# Patient Record
Sex: FEMALE | Race: WHITE | Employment: FULL TIME | URBAN - METROPOLITAN AREA
[De-identification: names, ages, dates, MRNs, and addresses within clinical notes are randomized per-mention and may not be internally consistent; named-entity substitution may affect disease eponyms.]

---

## 2022-06-28 ENCOUNTER — OFFICE VISIT (OUTPATIENT)
Dept: ORTHOPEDIC SURGERY | Age: 62
End: 2022-06-28
Payer: COMMERCIAL

## 2022-06-28 VITALS — HEART RATE: 81 BPM | OXYGEN SATURATION: 100 % | WEIGHT: 162 LBS | TEMPERATURE: 96.1 F

## 2022-06-28 DIAGNOSIS — G89.29 CHRONIC BILATERAL LOW BACK PAIN WITH RIGHT-SIDED SCIATICA: Primary | ICD-10-CM

## 2022-06-28 DIAGNOSIS — M54.41 CHRONIC BILATERAL LOW BACK PAIN WITH RIGHT-SIDED SCIATICA: Primary | ICD-10-CM

## 2022-06-28 PROCEDURE — 99204 OFFICE O/P NEW MOD 45 MIN: CPT | Performed by: PHYSICAL MEDICINE & REHABILITATION

## 2022-06-28 PROCEDURE — 72100 X-RAY EXAM L-S SPINE 2/3 VWS: CPT | Performed by: PHYSICAL MEDICINE & REHABILITATION

## 2022-06-28 RX ORDER — MONTELUKAST SODIUM 10 MG/1
TABLET ORAL
COMMUNITY
Start: 2021-09-10

## 2022-06-28 RX ORDER — FLUTICASONE PROPIONATE 50 MCG
SPRAY, SUSPENSION (ML) NASAL
COMMUNITY
Start: 2022-06-27

## 2022-06-28 RX ORDER — GABAPENTIN 300 MG/1
300 CAPSULE ORAL
Qty: 30 CAPSULE | Refills: 0 | Status: SHIPPED | OUTPATIENT
Start: 2022-06-28 | End: 2022-07-28

## 2022-06-28 RX ORDER — DICLOFENAC SODIUM 75 MG/1
75 TABLET, DELAYED RELEASE ORAL
Qty: 28 TABLET | Refills: 0 | Status: SHIPPED | OUTPATIENT
Start: 2022-06-28 | End: 2022-07-12

## 2022-06-28 RX ORDER — LISINOPRIL AND HYDROCHLOROTHIAZIDE 20; 25 MG/1; MG/1
TABLET ORAL
COMMUNITY
Start: 2021-09-10

## 2022-06-28 RX ORDER — ALBUTEROL SULFATE 90 UG/1
2 AEROSOL, METERED RESPIRATORY (INHALATION)
COMMUNITY
Start: 2021-09-10

## 2022-06-28 RX ORDER — SIMVASTATIN 10 MG/1
TABLET, FILM COATED ORAL
COMMUNITY
Start: 2021-09-10

## 2022-06-28 RX ORDER — CALCIUM CARBONATE 200(500)MG
500 TABLET,CHEWABLE ORAL
COMMUNITY

## 2022-06-28 RX ORDER — ACETAMINOPHEN 500 MG
TABLET ORAL
COMMUNITY
End: 2022-09-07

## 2022-06-28 NOTE — PATIENT INSTRUCTIONS
Back Pain, Emergency or Urgent Symptoms: Care Instructions  Your Care Instructions     Many people have back pain at one time or another. In most cases, pain gets better with self-care that includes over-the-counter pain medicine, ice, heat, and exercises. Unless you have symptoms of a severe injury or heart attack, you may be able to give yourself a few days before you call a doctor. But some back problems are very serious. Do not ignore symptoms that need to be checked right away. Follow-up care is a key part of your treatment and safety. Be sure to make and go to all appointments, and call your doctor if you are having problems. It's also a good idea to know your test results and keep a list of the medicines you take. How can you care for yourself at home? · Sit or lie in positions that are most comfortable and that reduce your pain. Try one of these positions when you lie down:  ? Lie on your back with your knees bent and supported by large pillows. ? Lie on the floor with your legs on the seat of a sofa or chair. ? Lie on your side with your knees and hips bent and a pillow between your legs. ? Lie on your stomach if it does not make pain worse. · Do not sit up in bed, and avoid soft couches and twisted positions. Bed rest can help relieve pain at first, but it delays healing. Avoid bed rest after the first day. · Change positions every 30 minutes. If you must sit for long periods of time, take breaks from sitting. Get up and walk around, or lie flat. · Try using a heating pad on a low or medium setting, for 15 to 20 minutes every 2 or 3 hours. Try a warm shower in place of one session with the heating pad. You can also buy single-use heat wraps that last up to 8 hours. You can also try ice or cold packs on your back for 10 to 20 minutes at a time, several times a day. (Put a thin cloth between the ice pack and your skin.) This reduces pain and makes it easier to be active and exercise.   · Take pain medicines exactly as directed. ? If the doctor gave you a prescription medicine for pain, take it as prescribed. ? If you are not taking a prescription pain medicine, ask your doctor if you can take an over-the-counter medicine. When should you call for help? Call 911 anytime you think you may need emergency care. For example, call if:    · You are unable to move a leg at all.     · You have back pain with severe belly pain.     · You have symptoms of a heart attack. These may include:  ? Chest pain or pressure, or a strange feeling in the chest.  ? Sweating. ? Shortness of breath. ? Nausea or vomiting. ? Pain, pressure, or a strange feeling in the back, neck, jaw, or upper belly or in one or both shoulders or arms. ? Lightheadedness or sudden weakness. ? A fast or irregular heartbeat. After you call 911, the  may tell you to chew 1 adult-strength or 2 to 4 low-dose aspirin. Wait for an ambulance. Do not try to drive yourself. Call your doctor now or seek immediate medical care if:    · You have new or worse symptoms in your arms, legs, chest, belly, or buttocks. Symptoms may include:  ? Numbness or tingling. ? Weakness. ? Pain.     · You lose bladder or bowel control.     · You have back pain and:  ? You have injured your back while lifting or doing some other activity. Call if the pain is severe, has not gone away after 1 or 2 days, and you cannot do your normal daily activities. ? You have had a back injury before that needed treatment. ? Your pain has lasted longer than 4 weeks. ? You have had weight loss you cannot explain. ? You have a fever. ? You are age 48 or older. ? You have cancer now or have had it before. Watch closely for changes in your health, and be sure to contact your doctor if you are not getting better as expected. Where can you learn more?   Go to http://www.gray.com/  Enter J972 in the search box to learn more about \"Back Pain, Emergency or Urgent Symptoms: Care Instructions. \"  Current as of: July 1, 2021               Content Version: 13.2  © 2006-2022 [employer]. Care instructions adapted under license by [employer] (which disclaims liability or warranty for this information). If you have questions about a medical condition or this instruction, always ask your healthcare professional. [address] any warranty or liability for your use of this information. Back Care and Preventing Injuries: Care Instructions  Your Care Instructions     You can hurt your back doing many everyday activities: lifting a heavy box, bending down to garden, exercising at the gym, and even getting out of bed. But you can keep your back strong and healthy by doing some exercises. You also can follow a few tips for sitting, sleeping, and lifting to avoid hurting your back again. Talk to your doctor before you start an exercise program. Ask for help if you want to learn more about keeping your back healthy. Follow-up care is a key part of your treatment and safety. Be sure to make and go to all appointments, and call your doctor if you are having problems. It's also a good idea to know your test results and keep a list of the medicines you take. How can you care for yourself at home? · Stay at a healthy weight to avoid strain on your lower back. · Do not smoke. Smoking increases the risk of osteoporosis, which weakens the spine. If you need help quitting, talk to your doctor about stop-smoking programs and medicines. These can increase your chances of quitting for good. · Make sure you sleep in a position that maintains your back's normal curves and on a mattress that feels comfortable. Sleep on your side with a pillow between your knees, or sleep on your back with a pillow under your knees. These positions can reduce strain on your back. · When you get out of bed, lie on your side and bend both knees. Drop your feet over the edge of the bed as you push up with both arms. Scoot to the edge of the bed. Make sure your feet are in line with your rear end (buttocks), and then stand up. · If you must stand for a long time, put one foot on a stool, ledge, or box. Exercise to strengthen your back and other muscles  · Get at least 30 minutes of exercise on most days of the week. Walking is a good choice. You also may want to do other activities, such as running, swimming, cycling, or playing tennis or team sports. · Stretch your back muscles. Here are few exercises to try:  ? Lie on your back with your knees bent and your feet flat on the floor. Gently pull one bent knee to your chest. Put that foot back on the floor, and then pull the other knee to your chest. Hold for 15 to 30 seconds. Repeat 2 to 4 times. ? Do pelvic tilts. Lie on your back with your knees bent. Tighten your stomach muscles. Pull your belly button (navel) in and up toward your ribs. You should feel like your back is pressing to the floor and your hips and pelvis are slightly lifting off the floor. Hold for 6 seconds while breathing smoothly. · Keep your core muscles strong. The muscles of your back, belly (abdomen), and buttocks support your spine. ? Pull in your belly, and imagine pulling your navel toward your spine. Hold this for 6 seconds, then relax. Remember to keep breathing normally as you tense your muscles. ? Do curl-ups. Always do them with your knees bent. Keep your low back on the floor, and curl your shoulders toward your knees using a smooth, slow motion. Keep your arms folded across your chest. If this bothers your neck, try putting your hands behind your neck (not your head), with your elbows spread apart. ? Lie on your back with your knees bent and your feet flat on the floor. Tighten your belly muscles, and then push with your feet and raise your buttocks up a few inches.  Hold this position 6 seconds as you continue to breathe normally, then lower yourself slowly to the floor. Repeat 8 to 12 times. ? If you like group exercise, try Pilates or yoga. These classes have poses that strengthen the core muscles. Protect your back when you sit  · Place a small pillow, a rolled-up towel, or a lumbar roll in the curve of your back if you need extra support. · Sit in a chair that is low enough to let you place both feet flat on the floor with both knees nearly level with your hips. If your chair or desk is too high, use a foot rest to raise your knees. · When driving, keep your knees nearly level with your hips. Sit straight, and drive with both hands on the steering wheel. Your arms should be in a slightly bent position. · Try a kneeling chair, which helps tilt your hips forward. This takes pressure off your lower back. · Try sitting on an exercise ball. It can rock from side to side, which helps keep your back loose. Lift properly  · Squat down, bending at the hips and knees only. If you need to, put one knee to the floor and extend your other knee in front of you, bent at a right angle (half kneeling). · Press your chest straight forward. This helps keep your upper back straight while keeping a slight arch in your low back. · Hold the load as close to your body as possible, at the level of your navel. · Use your feet to change direction, taking small steps. · Lead with your hips as you change direction. Keep your shoulders in line with your hips as you move. Do not twist your body. · Set down your load carefully, squatting with your knees and hips only. When should you call for help? Watch closely for changes in your health, and be sure to contact your doctor if you have any problems. Where can you learn more? Go to http://www.gray.com/  Enter S810 in the search box to learn more about \"Back Care and Preventing Injuries: Care Instructions. \"  Current as of: July 1, 2021               Content Version: 13.2  © 0270-0080 [employer]. Care instructions adapted under license by [employer] (which disclaims liability or warranty for this information). If you have questions about a medical condition or this instruction, always ask your healthcare professional. [address] any warranty or liability for your use of this information. Back Stretches: Exercises  Introduction  Here are some examples of exercises for stretching your back. Start each exercise slowly. Ease off the exercise if you start to have pain. Your doctor or physical therapist will tell you when you can start these exercises and which ones will work best for you. How to do the exercises  Overhead stretch    1. Stand comfortably with your feet shoulder-width apart. 2. Looking straight ahead, raise both arms over your head and reach toward the ceiling. Do not allow your head to tilt back. 3. Hold for 15 to 30 seconds, then lower your arms to your sides. 4. Repeat 2 to 4 times. Side stretch    1. Stand comfortably with your feet shoulder-width apart. 2. Raise one arm over your head, and then lean to the other side. 3. Slide your hand down your leg as you let the weight of your arm gently stretch your side muscles. Hold for 15 to 30 seconds. 4. Repeat 2 to 4 times on each side. Press-up    1. Lie on your stomach, supporting your body with your forearms. 2. Press your elbows down into the floor to raise your upper back. As you do this, relax your stomach muscles and allow your back to arch without using your back muscles. As your press up, do not let your hips or pelvis come off the floor. 3. Hold for 15 to 30 seconds, then relax. 4. Repeat 2 to 4 times. Relax and rest    1. Lie on your back with a rolled towel under your neck and a pillow under your knees. Extend your arms comfortably to your sides. 2. Relax and breathe normally.   3. Remain in this position for about 10 minutes. 4. If you can, do this 2 or 3 times each day. Follow-up care is a key part of your treatment and safety. Be sure to make and go to all appointments, and call your doctor if you are having problems. It's also a good idea to know your test results and keep a list of the medicines you take. Where can you learn more? Go to http://www.ferrer.com/  Enter Y090 in the search box to learn more about \"Back Stretches: Exercises. \"  Current as of: July 1, 2021               Content Version: 13.2  © 2006-2022 [employer]. Care instructions adapted under license by [employer] (which disclaims liability or warranty for this information). If you have questions about a medical condition or this instruction, always ask your healthcare professional. [address] any warranty or liability for your use of this information. Acute Low Back Pain: Exercises  Introduction  Here are some examples of typical rehabilitation exercises for your condition. Start each exercise slowly. Ease off the exercise if you start to have pain. Your doctor or physical therapist will tell you when you can start these exercises and which ones will work best for you. When you are not being active, find a comfortable position for rest. Some people are comfortable on the floor or a medium-firm bed with a small pillow under their head and another under their knees. Some people prefer to lie on their side with a pillow between their knees. Don't stay in one position for too long. Take short walks (10 to 20 minutes) every 2 to 3 hours. Avoid slopes, hills, and stairs until you feel better. Walk only distances you can manage without pain, especially leg pain. How to do the exercises  Back stretches    1. Get down on your hands and knees on the floor. 2. Relax your head and allow it to droop.  Round your back up toward the ceiling until you feel a nice stretch in your upper, middle, and lower back. Hold this stretch for as long as it feels comfortable, or about 15 to 30 seconds. 3. Return to the starting position with a flat back while you are on your hands and knees. 4. Let your back sway by pressing your stomach toward the floor. Lift your buttocks toward the ceiling. 5. Hold this position for 15 to 30 seconds. 6. Repeat 2 to 4 times. Follow-up care is a key part of your treatment and safety. Be sure to make and go to all appointments, and call your doctor if you are having problems. It's also a good idea to know your test results and keep a list of the medicines you take. Where can you learn more? Go to http://www.gray.com/  Enter Z071 in the search box to learn more about \"Acute Low Back Pain: Exercises. \"  Current as of: September 8, 2021               Content Version: 13.2  © 2006-2022 [employer]. Care instructions adapted under license by [employer] (which disclaims liability or warranty for this information). If you have questions about a medical condition or this instruction, always ask your healthcare professional. [place] [address] any warranty or liability for your use of this information.

## 2022-06-28 NOTE — PROGRESS NOTES
[address]  [address], [address]  [place], [address] [place] [address] Street  Phone: [phone]  Fax: [phone]      Patient: Denisha George                                                                              MRN: [id number]        YOB: 1960          AGE: 62 y.o. PCP: Luiza Ceron MD  Date:  06/28/22    Reason for Consultation: New Patient      HPI:  Denisha George is a 62 y.o. female with relevant PMH of scoliosis, HTN, who presents with chronic low back pain with intermittent radiation down right and left legs which has been manageable in the past.  About 5 weeks ago her symptoms worsened after sitting on a hard seat for over 5 hrs. She developed pain radiating down the right leg which has not resolved. She has tried a prednisone pack (felt great the first day but pain returned)  and tylenol. Neurologic symptoms: No numbness, tingling, weakness, bowel or bladder changes. No recent falls      Location: The pain is located in the low back  Radiation: The pain does radiate right anterior lateral thigh, front of leg into the foot. Pain Score: Currently: 4/10    Quality: Pain is of a Achy, Burning, Cramping and Stabbing quality. Aggravating: Pain is exacerbated by sitting and driving, yard work, cleaning  Alleviating:  The pain is alleviated by walking, standing, lying down and exercise    Prior Treatments:  Physical therapy: NO  Injections:NO    Previous Medications:   Current Medications: prednisone-taper, tylenol  Previous work-up has included:     Past Medical History:   Past Medical History:   Diagnosis Date    High cholesterol     Hypertension     Prediabetes       Past Surgical History:   Past Surgical History:   Procedure Laterality Date    HC TOTAL HYSTERECTOMY        SocHx:   Social History     Tobacco Use    Smoking status: Never Smoker    Smokeless tobacco: Never Used   Substance Use Topics    Alcohol use: Yes     Comment: occasionally      FamHx:? Family History   Problem Relation Age of Onset    Stroke Mother     Diabetes Father     Hypertension Father     Heart Disease Father        Current Medications:    Current Outpatient Medications   Medication Sig Dispense Refill    albuterol (PROVENTIL HFA, VENTOLIN HFA, PROAIR HFA) 90 mcg/actuation inhaler Take 2 Puffs by inhalation.  calcium carbonate (TUMS) 200 mg calcium (500 mg) chew Take 500 mg by mouth.  fluticasone propionate (Flonase Allergy Relief) 50 mcg/actuation nasal spray       krill-om-3-dha-epa-phospho-ast [id number] mg cap Take  by mouth.  lisinopril-hydroCHLOROthiazide (PRINZIDE, ZESTORETIC) 20-25 mg per tablet       montelukast (SINGULAIR) 10 mg tablet       therapeutic multivitamin-minerals (THERAGRAN-M) tablet Take 1 Tablet by mouth daily.  simvastatin (ZOCOR) 10 mg tablet         Allergies: Allergies   Allergen Reactions    Erythromycin Hives    Sulfa (Sulfonamide Antibiotics) Hives        Review of Systems:   Gen:    Denied fevers, chills, malaise, fatigue, weight changes   Resp: Denied shortness of breath, cough, wheezing   CVS: Denied chest pain, palpitations   : Denied urinary urgency, frequency, incontinence   GI: Denied nausea, vomiting, constipation, diarrhea   Skin: Denied rashes, wounds   Psych: Denied anxiety, depression   Vasc: Denied claudication, ulcers   Hem: Denied easy bruising/bleeding   MSK: See HPI   Neuro: See HPI         Physical Exam     Vital Signs:   Visit Vitals  Pulse 81   Temp (!) 96.1 °F (35.6 °C) (Temporal)   Wt 162 lb (73.5 kg)   SpO2 100%      General: ??????? Well nourished and well developed female without any acute distress   Psychiatric: ?  Alert and oriented x 3 with normal mood    HEENT: ???????? Atraumatic   Respiratory:   Breathing non-labored and non dyspneic   CV: ???????????????? Peripheral pulses intact, no peripheral edema   Skin: ?????????????   No rashes Neurologic: ?? Sensation: normal and grossly intact thebilateral, lower extremity(s)  Strength: 5/5 in the bilateral, lower extremity(s)   Reflexes: reveals 2+ symmetric DTRs throughout   Gait: normal     Musculoskeletal: Lumbar Exam     Inspection:   Alignment: Abnormal scoliosis  Atrophy: None     Tenderness to Palpation:   Lumbar paraspinals Positive +b/l  Lumbar spinous processes Negative  SI Joint:  Positive ++  Gluteal:Positive++   Greater trochanter: Negative      ROM:   Lumbar ROM: Abnormal pain with extension  Lumbar facet loading: Negative  Hip ROM: No reproduction of pain with movement     Special Tests      Slump test: Positive right   SLR: Negative  SELENE: Negative  FADIR: Negative  Log Roll: Negative      Medical Decision Making:    Images: The imaging results as well as the actual images of the studies below were reviewed, visualized and interpreted by me. Labs: The results below were reviewed. Lumbar spine x-ray 2 views AP and Lat- thoracolumbar scoliosis, lower lumbar DDD     Assessment:   -scoliosis  -right lumbar radiculopathy     Plan:      -Physical therapy -  Referral to start PT   -Medications -    -will try diclofenac 75mg bid x 10 days. Not to take with other NSAIDS-- Counseled regarding side effects and appropriate administration of medications.     -Start gabapentin 300mg qhs   Reviewed potential side effects including but not limited to: drowsiness, dizziness, feeling in a fog, mood changes, fluid retention.   -Diagnostics/Imaging - x-ray lumbar spine POC  -Injections - Consider DA   -Lifestyle -Discussed activities to avoid and those to continue   -Education - The patient's diagnosis, prognosis and treatment options were discussed today. All questions were answered.     F/U - 4 weeks if no improvement will get MRI lumbar spine         [facility]

## 2022-07-13 ENCOUNTER — TELEPHONE (OUTPATIENT)
Dept: ORTHOPEDIC SURGERY | Age: 62
End: 2022-07-13

## 2022-07-13 NOTE — TELEPHONE ENCOUNTER
Patient called stating her Voltaren did not help with her pain, and she is having side affects from her Gabapentin. She stated she has to force herself to wake up in the morning because the Gabapentin makes her drowsy. She stated she is going to stop taking the Gabapentin. Patient stated an MRI order was discussed at her appointment, and she feels she is needing one. Patient is requesting a call back and can be reached at [phone].

## 2022-07-14 ENCOUNTER — TELEPHONE (OUTPATIENT)
Dept: ORTHOPEDIC SURGERY | Age: 62
End: 2022-07-14

## 2022-07-14 NOTE — TELEPHONE ENCOUNTER
Returned call and left VM.  I can place and order for MRI but I am worried it may be denied if she has not had any physical therapy yet

## 2022-07-14 NOTE — LETTER
7/27/2022 4:55 PM    Ms. Gerri Tovar  [address] [place] [id number]    To whom it may concern,    Gerri Tovar is currently under my care for low back pain. Due to weakness she has in her foot I recommend she wear a comfortable, supportive, shoe like a sneaker for stability.             Sincerely,      Trevon Ruiz MD

## 2022-07-14 NOTE — TELEPHONE ENCOUNTER
Patient called requesting doctor to call back, patient stated she missed phone call she stated she was in physical therapy.  Patient contact [phone]

## 2022-07-15 NOTE — TELEPHONE ENCOUNTER
Patient stated she called Dr Bala Landry yesterday and has been awaiting a returned call all day in regards to previous messages. Patient also states that she has been taking physical therapy.       Patient advise callback   [facility]#[phone]

## 2022-07-26 ENCOUNTER — OFFICE VISIT (OUTPATIENT)
Dept: ORTHOPEDIC SURGERY | Age: 62
End: 2022-07-26
Payer: COMMERCIAL

## 2022-07-26 VITALS
WEIGHT: 162 LBS | SYSTOLIC BLOOD PRESSURE: 135 MMHG | HEART RATE: 68 BPM | RESPIRATION RATE: 18 BRPM | OXYGEN SATURATION: 98 % | HEIGHT: 63 IN | DIASTOLIC BLOOD PRESSURE: 69 MMHG | TEMPERATURE: 97.4 F | BODY MASS INDEX: 28.7 KG/M2

## 2022-07-26 DIAGNOSIS — M70.61 GREATER TROCHANTERIC BURSITIS OF BOTH HIPS: ICD-10-CM

## 2022-07-26 DIAGNOSIS — M54.16 RIGHT LUMBAR RADICULOPATHY: Primary | ICD-10-CM

## 2022-07-26 DIAGNOSIS — M70.62 GREATER TROCHANTERIC BURSITIS OF BOTH HIPS: ICD-10-CM

## 2022-07-26 DIAGNOSIS — M54.16 RIGHT LUMBAR RADICULOPATHY: ICD-10-CM

## 2022-07-26 PROCEDURE — 99213 OFFICE O/P EST LOW 20 MIN: CPT | Performed by: PHYSICAL MEDICINE & REHABILITATION

## 2022-07-26 NOTE — PATIENT INSTRUCTIONS
[facility]    Please expect an automated call within 24-48 business hours to schedule your outpatient study with [employer]    If you have not received an automated call, please call [phone] to speak directly with a     [address] at [address]

## 2022-07-26 NOTE — PROGRESS NOTES
Fran Grade presents today for   Chief Complaint   Patient presents with    Back Pain    Hip Pain       Is someone accompanying this pt? no    Is the patient using any DME equipment during OV? no    Depression Screening:  No flowsheet data found. Learning Assessment:  No flowsheet data found. Abuse Screening:  No flowsheet data found. Fall Risk  No flowsheet data found. OPIOID RISK TOOL  No flowsheet data found. Coordination of Care:  1. Have you been to the ER, urgent care clinic since your last visit? no  Hospitalized since your last visit? no    2. Have you seen or consulted any other health care providers outside of the [address] [place] [address] since your last visit? no Include any pap smears or colon screening.  Yes in june

## 2022-07-27 NOTE — TELEPHONE ENCOUNTER
Patient called for Ana Zavala. Patient said that she is having her Mri of the Lumbar Spine done on 8/12/22 at [facility]. Patient said that she needs a Letter from Ana Zavala for work. That the note should state that she is allowed to wear Tennis Shoes at work. Instead of High Heels. Patient tel. [phone].

## 2022-07-28 NOTE — TELEPHONE ENCOUNTER
I tried to contact the pt. She was not able to be reached. A message was left for the pt letting her know that the letter that was requested has been completed. I let the pt know that she can access this through her My Chart account or she has the option to come by the office and they will print her a copy. The number to the office was provided in case the pt has any questions or concerns.

## 2022-08-15 ENCOUNTER — HOSPITAL ENCOUNTER (OUTPATIENT)
Age: 62
Discharge: HOME | End: 2022-08-15
Attending: PHYSICAL MEDICINE & REHABILITATION
Payer: COMMERCIAL

## 2022-08-15 PROCEDURE — 72148 MRI LUMBAR SPINE W/O DYE: CPT

## 2022-09-07 ENCOUNTER — PATIENT MESSAGE (OUTPATIENT)
Dept: ORTHOPEDIC SURGERY | Age: 62
End: 2022-09-07

## 2022-09-07 ENCOUNTER — OFFICE VISIT (OUTPATIENT)
Dept: ORTHOPEDIC SURGERY | Age: 62
End: 2022-09-07
Payer: COMMERCIAL

## 2022-09-07 VITALS
BODY MASS INDEX: 27.83 KG/M2 | WEIGHT: 163 LBS | RESPIRATION RATE: 18 BRPM | TEMPERATURE: 97.5 F | SYSTOLIC BLOOD PRESSURE: 132 MMHG | DIASTOLIC BLOOD PRESSURE: 81 MMHG | OXYGEN SATURATION: 98 % | HEART RATE: 91 BPM | HEIGHT: 64 IN

## 2022-09-07 DIAGNOSIS — M54.41 CHRONIC BILATERAL LOW BACK PAIN WITH RIGHT-SIDED SCIATICA: ICD-10-CM

## 2022-09-07 DIAGNOSIS — M54.16 RIGHT LUMBAR RADICULOPATHY: Primary | ICD-10-CM

## 2022-09-07 DIAGNOSIS — G89.29 CHRONIC BILATERAL LOW BACK PAIN WITH RIGHT-SIDED SCIATICA: ICD-10-CM

## 2022-09-07 PROCEDURE — 99214 OFFICE O/P EST MOD 30 MIN: CPT | Performed by: PHYSICAL MEDICINE & REHABILITATION

## 2022-09-07 NOTE — PROGRESS NOTES
Hu Mujica presents today for   Chief Complaint   Patient presents with    Back Pain       Is someone accompanying this pt? no    Is the patient using any DME equipment during OV? no    Depression Screening:  No flowsheet data found. Learning Assessment:  No flowsheet data found. Abuse Screening:  No flowsheet data found. Fall Risk  No flowsheet data found. OPIOID RISK TOOL  No flowsheet data found. Coordination of Care:  1. Have you been to the ER, urgent care clinic since your last visit? no  Hospitalized since your last visit? no    2. Have you seen or consulted any other health care providers outside of the [address] [place] [address] since your last visit? no Include any pap smears or colon screening.  no

## 2022-09-07 NOTE — PROGRESS NOTES
[address]  [address], [address]  [facility], [address]  Phone: [phone]  Fax: [phone]      Patient: Georgiana Goldman                                                                              MRN: [id number]        YOB: 1960          AGE: 65 y.o. PCP: Brady Leigh MD  Date:  09/07/22    Reason for Consultation: Back Pain      HPI:  Georgiana Goldman is a 65 y.o. female with relevant PMH of scoliosis, HTN, who presented with chronic low back pain with intermittent radiation down right and left legs which has been manageable in the past.  Around memorial day 2022 ago her symptoms worsened after sitting on a hard seat for over 5 hrs. She developed pain radiating down the right leg which has not resolved. She has tried a prednisone pack (felt great the first day but pain returned)  and tylenol. Since her last visit she has completed PT. Pain has improved but she continues to have weakness in her right foot. We got an MRI which demonstrates degenerative changes severe disc space narrowing L5/S1, mild foraminal narrowing and on right side mild right L4 nerve abutment       Neurologic symptoms: No numbness, tingling, weakness, bowel or bladder changes. No recent falls      Location: The pain is located in the low back and b/l hips   Radiation: The pain does radiate right anterior lateral thigh, front of leg into the foot. Pain Score: Currently: 1/10  , at worst 3/10   Quality: Pain is of a Achy, Burning, Cramping and Stabbing quality. Aggravating: Pain is exacerbated by sitting and driving, yard work, cleaning  Alleviating:  The pain is alleviated by walking, standing, lying down and exercise    Prior Treatments:  Physical therapy: Currently in PT x 3 weeks  Injections:NO    Previous Medications: prednisone-taper, tylenol, diclofenac, gabapentin 300mg   Current Medications:   Previous work-up has included: 6/28/2022- Lumbar spine x-ray 2 views AP and Lat- thoracolumbar scoliosis, lower lumbar DDD   MRI Results (most recent):  Results from Orders Only encounter on 07/26/22    MRI LUMB SPINE WO CONT    Narrative  EXAMINATION: MRI lumbar spine without contrast    INDICATION: Lumbar pain, right lower extremity pain    COMPARISON: None    TECHNIQUE: Sagittal and axial multiecho MRI sequences of the lumbar spine  without contrast.    FINDINGS:    General: Vertebral body heights preserved. L5-S1 disc space loss with evidence  of at least partial solid interbody fusion. Mild L4-L5 disc space loss. No  significant listhesis. Lumbar lordosis maintained. Conus and the level L1. No  abnormal signal in the distal cord. No abnormal epidural collection identified. No suspicious marrow signal.    Miscellaneous: No incidental significant findings outside of the lumbar spine  region. Levels:    T11-T12: Sagittal plane only. No significant degenerative change or stenosis. T12-L1: Tiny posterior central disc protrusion. Mild facet evident. Spinal canal  and foramina largely patent. L1-L2: No significant disc disease or stenosis. Mild facet arthropathy. L2-L3: Very minimal disc bulge. Mild facet arthropathy. Spinal canal artery  patent with minimal abutment of the crossing L3 nerves. Foramina patent. L3-L4: Mild disc bulge. Mild facet arthropathy. Spinal canal are patent with  abutment of the crossing right L4 nerve. Mild left foraminal stenosis. L4-L5: Left lateral recess tiny disc protrusion with background minimal disc  bulge. Moderate facet arthropathy. Spinal canal largely patent with minimal  abutment of the crossing L5 nerves. Minimal foraminal narrowing. L5-S1: Disc space loss with mild disc/osteophyte bulge. Mild facet arthropathy. Spinal canal patent. Mild left foraminal stenosis and mild abutment of the  exiting L5 roots bilaterally. Imaged sacrum: Unremarkable.     Impression  Multilevel mild disc herniations and notable L5-S1 disc space loss. Multilevel  mild to moderate facet arthropathy.  -Spinal canal largely patent. -Multifocal mild foraminal stenoses. Sites of nerve root abutment described  above. See additional details above. Past Medical History:   Past Medical History:   Diagnosis Date    High cholesterol     Hypertension     Prediabetes       Past Surgical History:   Past Surgical History:   Procedure Laterality Date    HC TOTAL HYSTERECTOMY        SocHx:   Social History     Tobacco Use    Smoking status: Never    Smokeless tobacco: Never   Substance Use Topics    Alcohol use: Yes     Comment: occasionally      FamHx:? Family History   Problem Relation Age of Onset    Stroke Mother     Diabetes Father     Hypertension Father     Heart Disease Father        Current Medications:    Current Outpatient Medications   Medication Sig Dispense Refill    beclomethasone dipropionate (QVAR REDIHALER HFA) 40 mcg/actuation HFAb inhaler Take 2 Puffs by inhalation two (2) times a day. albuterol (PROVENTIL HFA, VENTOLIN HFA, PROAIR HFA) 90 mcg/actuation inhaler Take 2 Puffs by inhalation. calcium carbonate (TUMS) 200 mg calcium (500 mg) chew Take 500 mg by mouth. fluticasone propionate (FLONASE) 50 mcg/actuation nasal spray       lisinopril-hydroCHLOROthiazide (PRINZIDE, ZESTORETIC) 20-25 mg per tablet       montelukast (SINGULAIR) 10 mg tablet       therapeutic multivitamin-minerals (THERAGRAN-M) tablet Take 1 Tablet by mouth daily. simvastatin (ZOCOR) 10 mg tablet       [id number]-epa-phospho-ast [id number] mg cap Take  by mouth. Allergies:     Allergies   Allergen Reactions    Latex, Natural Rubber Hives, Itching, Rash and Swelling     gloves      Erythromycin Hives    Sulfa (Sulfonamide Antibiotics) Hives        Review of Systems:   Gen:    Denied fevers, chills, malaise, fatigue, weight changes   Resp: Denied shortness of breath, cough, wheezing   CVS: Denied chest pain, palpitations   : Denied urinary urgency, frequency, incontinence   GI: Denied nausea, vomiting, constipation, diarrhea   Skin: Denied rashes, wounds   Psych: Denied anxiety, depression   Vasc: Denied claudication, ulcers   Hem: Denied easy bruising/bleeding   MSK: See HPI   Neuro: See HPI         Physical Exam     Vital Signs:   Visit Vitals  /81 (BP 1 Location: Right arm, BP Patient Position: Sitting, BP Cuff Size: Adult)   Pulse 91   Temp 97.5 °F (36.4 °C) (Temporal)   Resp 18   Ht 5' 3.5\" (1.613 m)   Wt 163 lb (73.9 kg)   SpO2 98% Comment: RA   BMI 28.42 kg/m²      General: ??????? Well nourished and well developed female without any acute distress   Psychiatric: ?  Alert and oriented x 3 with normal mood    HEENT: ???????? Atraumatic   Respiratory:   Breathing non-labored and non dyspneic   CV: ???????????????? Peripheral pulses intact, no peripheral edema   Skin: ????????????? No rashes       Neurologic: ?? Sensation: normal and grossly intact thebilateral, lower extremity(s)  Strength: 5/5 in the bilateral, lower extremity(s) except weakness right ankle DF 4/5   Reflexes: reveals 2+ symmetric DTRs throughout   Gait: normal     Musculoskeletal: Lumbar Exam     Inspection:   Alignment: Abnormal scoliosis  Atrophy: None     Tenderness to Palpation:   Lumbar paraspinals Positive +b/l  Lumbar spinous processes Negative  SI Joint:  Positive ++  Gluteal:Positive++   Greater trochanter: Positive b/l      ROM:   Lumbar ROM: Abnormal pain with extension  Lumbar facet loading: Negative  Hip ROM: No reproduction of pain with movement     Special Tests      Slump test: Positive right   SLR: Negative  SELENE: Negative  FADIR: Negative  Log Roll: Negative      Medical Decision Making:    Images: The imaging results as well as the actual images of the studies below were reviewed, visualized and interpreted by me. Labs: The results below were reviewed.    Lumbar spine x-ray 2 views AP and Lat- thoracolumbar scoliosis, lower lumbar DDD   MRI lumbar spine 9/7/2022- DDD l5/S1 ---DDD, L3/4 right l4 nerve abutment    Assessment:   -scoliosis  -right lumbar radiculopathy -l4  -b/l GT bursitis     Plan:      -Physical therapy -  Continue HEP. Exercises provided to help strengthening b/l hips gluteus medius   -Medications - NA  -Diagnostics/Imaging - MRI lumbar spine reviewed  -Injections - scheduled right L4 TF DA   -Lifestyle -Discussed activities to avoid and those to continue   -Education - The patient's diagnosis, prognosis and treatment options were discussed today. All questions were answered.     F/U - after DA recheck strength        [facility]

## 2022-09-15 ENCOUNTER — HOSPITAL ENCOUNTER (OUTPATIENT)
Age: 62
Setting detail: OUTPATIENT SURGERY
Discharge: HOME | End: 2022-09-15
Attending: PHYSICAL MEDICINE & REHABILITATION | Admitting: PHYSICAL MEDICINE & REHABILITATION
Payer: COMMERCIAL

## 2022-09-15 ENCOUNTER — APPOINTMENT (OUTPATIENT)
Dept: GENERAL RADIOLOGY | Age: 62
End: 2022-09-15
Attending: PHYSICAL MEDICINE & REHABILITATION
Payer: COMMERCIAL

## 2022-09-15 VITALS
TEMPERATURE: 98.5 F | HEART RATE: 83 BPM | RESPIRATION RATE: 16 BRPM | SYSTOLIC BLOOD PRESSURE: 143 MMHG | OXYGEN SATURATION: 95 % | DIASTOLIC BLOOD PRESSURE: 79 MMHG

## 2022-09-15 LAB — GLUCOSE BLD STRIP.AUTO-MCNC: 126 MG/DL (ref 70–110)

## 2022-09-15 PROCEDURE — 77030003669 HC NDL SPN COOK -B: Performed by: PHYSICAL MEDICINE & REHABILITATION

## 2022-09-15 PROCEDURE — 74011000636 HC RX REV CODE- 636: Performed by: PHYSICAL MEDICINE & REHABILITATION

## 2022-09-15 PROCEDURE — 74011250636 HC RX REV CODE- 250/636: Performed by: PHYSICAL MEDICINE & REHABILITATION

## 2022-09-15 PROCEDURE — 82962 GLUCOSE BLOOD TEST: CPT

## 2022-09-15 PROCEDURE — 74011250637 HC RX REV CODE- 250/637: Performed by: PHYSICAL MEDICINE & REHABILITATION

## 2022-09-15 PROCEDURE — 77030039433 HC TY MYLEOGRAM BD -B: Performed by: PHYSICAL MEDICINE & REHABILITATION

## 2022-09-15 PROCEDURE — 74011000250 HC RX REV CODE- 250: Performed by: PHYSICAL MEDICINE & REHABILITATION

## 2022-09-15 PROCEDURE — 77030003676 HC NDL SPN MPRI -A: Performed by: PHYSICAL MEDICINE & REHABILITATION

## 2022-09-15 PROCEDURE — 64483 NJX AA&/STRD TFRM EPI L/S 1: CPT | Performed by: PHYSICAL MEDICINE & REHABILITATION

## 2022-09-15 PROCEDURE — 76010000009 HC PAIN MGT 0 TO 30 MIN PROC: Performed by: PHYSICAL MEDICINE & REHABILITATION

## 2022-09-15 PROCEDURE — 2709999900 HC NON-CHARGEABLE SUPPLY: Performed by: PHYSICAL MEDICINE & REHABILITATION

## 2022-09-15 RX ORDER — DEXAMETHASONE SODIUM PHOSPHATE 100 MG/10ML
INJECTION INTRAMUSCULAR; INTRAVENOUS AS NEEDED
Status: DISCONTINUED | OUTPATIENT
Start: 2022-09-15 | End: 2022-09-15 | Stop reason: HOSPADM

## 2022-09-15 RX ORDER — DIAZEPAM 5 MG/1
5-20 TABLET ORAL ONCE
Status: COMPLETED | OUTPATIENT
Start: 2022-09-15 | End: 2022-09-15

## 2022-09-15 RX ORDER — LIDOCAINE HYDROCHLORIDE 10 MG/ML
INJECTION, SOLUTION EPIDURAL; INFILTRATION; INTRACAUDAL; PERINEURAL AS NEEDED
Status: DISCONTINUED | OUTPATIENT
Start: 2022-09-15 | End: 2022-09-15 | Stop reason: HOSPADM

## 2022-09-15 RX ADMIN — DIAZEPAM 5 MG: 5 TABLET ORAL at 13:17

## 2022-09-15 NOTE — PROCEDURES
PROCEDURE NOTE      Patient Name: Georgiana Goldman    Date of Procedure: September 15, 2022    Preoperative Diagnosis:  Lumbar radiculopathy [M54.16]    Post Operative Diagnosis:  Lumbar radiculopathy [M54.16]    Procedure :    right L4 Selective Nerve Root Block      Consent:  Informed consent was obtained prior to the procedure. The patient was given the opportunity to ask questions regarding the procedure and its associated risks. In addition to the potential risks associated with the procedure itself, the patient was informed both verbally and in writing of the potential side effects of the use of glucocorticoid. The patient appeared to comprehend the informed consent and desired to have the procedure performed. Procedure: The patient was placed in the prone position on the fluoroscopy table and the back was prepped and draped in the usual sterile manner. The exact spinal level was  identified using fluoroscopy, and Lidocaine 1 % was injected locally, a # 22 gauge spinal needle was passed to the transverse process. The depth was noted and the needle redirected to pass inferior and approximately one cm anterior to the transverse process. YES  1 cc of Isovue M-200 was used to verify positioning in the epidural and paravertebral space and outlined the course of the spinal nerve into the epidural space. The same procedure was repeated at each spinal level indicated above. A total of 10 mg of preservative free Dexamethasone and 1 cc of Lidocaine was slowly injected. The patient tolerated the procedure well. The injection area was cleaned and bandaids applied. Not excessive bleeding was noted. Patient dressed and discharged to home with instructions. Discussion: The patient tolerated the procedure well.                                               Akbar Herrmann MD  September 15, 2022

## 2022-09-15 NOTE — PERIOP NOTES
Patient verbalized understanding of discharge instructions and verbally consented to [facility] Patient Consent. Signature pad not working.

## 2022-10-25 ENCOUNTER — OFFICE VISIT (OUTPATIENT)
Dept: ORTHOPEDIC SURGERY | Age: 62
End: 2022-10-25
Payer: COMMERCIAL

## 2022-10-25 VITALS
OXYGEN SATURATION: 99 % | WEIGHT: 165.4 LBS | SYSTOLIC BLOOD PRESSURE: 118 MMHG | TEMPERATURE: 97.9 F | HEART RATE: 76 BPM | HEIGHT: 63 IN | RESPIRATION RATE: 18 BRPM | DIASTOLIC BLOOD PRESSURE: 81 MMHG | BODY MASS INDEX: 29.3 KG/M2

## 2022-10-25 DIAGNOSIS — M54.41 CHRONIC BILATERAL LOW BACK PAIN WITH RIGHT-SIDED SCIATICA: ICD-10-CM

## 2022-10-25 DIAGNOSIS — M54.16 RIGHT LUMBAR RADICULOPATHY: Primary | ICD-10-CM

## 2022-10-25 DIAGNOSIS — G89.29 CHRONIC BILATERAL LOW BACK PAIN WITH RIGHT-SIDED SCIATICA: ICD-10-CM

## 2022-10-25 PROCEDURE — 99212 OFFICE O/P EST SF 10 MIN: CPT | Performed by: PHYSICAL MEDICINE & REHABILITATION

## 2022-10-25 PROCEDURE — 3078F DIAST BP <80 MM HG: CPT | Performed by: PHYSICAL MEDICINE & REHABILITATION

## 2022-10-25 PROCEDURE — 3074F SYST BP LT 130 MM HG: CPT | Performed by: PHYSICAL MEDICINE & REHABILITATION

## 2022-10-25 NOTE — PROGRESS NOTES
Don Erp presents today for   Chief Complaint   Patient presents with    Back Pain       Is someone accompanying this pt? no    Is the patient using any DME equipment during OV? no    Depression Screening:  No flowsheet data found. Learning Assessment:  No flowsheet data found. Abuse Screening:  No flowsheet data found. Fall Risk  No flowsheet data found. OPIOID RISK TOOL  No flowsheet data found. Coordination of Care:  1. Have you been to the ER, urgent care clinic since your last visit? no  Hospitalized since your last visit? no    2. Have you seen or consulted any other health care providers outside of the [address] [place] [address] since your last visit? no Include any pap smears or colon screening.  no

## 2022-10-25 NOTE — PROGRESS NOTES
[address]  [address], [address]  [place], [address] [place] [address] Street  Phone: [phone]  Fax: [phone]      Patient: Betzaida Butler                                                                              MRN: [id number]        YOB: 1960          AGE: 65 y.o. PCP: José Antonio Kee MD  Date:  10/25/22    Reason for Consultation: Back Pain      HPI:  Betzaida Butler is a 65 y.o. female with relevant PMH of scoliosis, HTN, who presented with chronic low back pain with intermittent radiation down right and left legs which has been manageable in the past.  Around memorial day 2022 ago her symptoms worsened after sitting on a hard seat for over 5 hrs. She developed pain radiating down the right leg which has not resolved. She has tried a prednisone pack (felt great the first day but pain returned)  and tylenol. Since her last visit she has completed PT. We got an MRI which demonstrates degenerative changes severe disc space narrowing L5/S1, mild foraminal narrowing and on right side mild right L4 nerve abutment. She had a right L4 TF DA 9/14/22 which helped tremendously and today she does not have pain. Neurologic symptoms: No numbness, tingling, weakness, bowel or bladder changes. No recent falls      Location: The pain is located in the low back and b/l hips   Radiation: The pain does radiate right anterior lateral thigh, front of leg into the foot. Pain Score: Currently: 0/10   Quality: Pain is of a Achy, Burning, Cramping and Stabbing quality. Aggravating: Pain is exacerbated by sitting and driving, yard work, cleaning  Alleviating:  The pain is alleviated by walking, standing, lying down and exercise    Prior Treatments:  Physical therapy: Completed PT  Injections:  9/15/22- right L4 SNRB Dr. Olivia Centeno 100% relief     Previous Medications: prednisone-taper, tylenol, diclofenac, gabapentin 300mg   Current Medications: Previous work-up has included:   6/28/2022- Lumbar spine x-ray 2 views AP and Lat- thoracolumbar scoliosis, lower lumbar DDD   MRI Results (most recent):  Results from Orders Only encounter on 07/26/22    MRI LUMB SPINE WO CONT    Narrative  EXAMINATION: MRI lumbar spine without contrast    INDICATION: Lumbar pain, right lower extremity pain    COMPARISON: None    TECHNIQUE: Sagittal and axial multiecho MRI sequences of the lumbar spine  without contrast.    FINDINGS:    General: Vertebral body heights preserved. L5-S1 disc space loss with evidence  of at least partial solid interbody fusion. Mild L4-L5 disc space loss. No  significant listhesis. Lumbar lordosis maintained. Conus and the level L1. No  abnormal signal in the distal cord. No abnormal epidural collection identified. No suspicious marrow signal.    Miscellaneous: No incidental significant findings outside of the lumbar spine  region. Levels:    T11-T12: Sagittal plane only. No significant degenerative change or stenosis. T12-L1: Tiny posterior central disc protrusion. Mild facet evident. Spinal canal  and foramina largely patent. L1-L2: No significant disc disease or stenosis. Mild facet arthropathy. L2-L3: Very minimal disc bulge. Mild facet arthropathy. Spinal canal artery  patent with minimal abutment of the crossing L3 nerves. Foramina patent. L3-L4: Mild disc bulge. Mild facet arthropathy. Spinal canal are patent with  abutment of the crossing right L4 nerve. Mild left foraminal stenosis. L4-L5: Left lateral recess tiny disc protrusion with background minimal disc  bulge. Moderate facet arthropathy. Spinal canal largely patent with minimal  abutment of the crossing L5 nerves. Minimal foraminal narrowing. L5-S1: Disc space loss with mild disc/osteophyte bulge. Mild facet arthropathy. Spinal canal patent. Mild left foraminal stenosis and mild abutment of the  exiting L5 roots bilaterally.     Imaged sacrum: Unremarkable. Impression  Multilevel mild disc herniations and notable L5-S1 disc space loss. Multilevel  mild to moderate facet arthropathy.  -Spinal canal largely patent. -Multifocal mild foraminal stenoses. Sites of nerve root abutment described  above. See additional details above. Past Medical History:   Past Medical History:   Diagnosis Date    High cholesterol     Hypertension     Prediabetes       Past Surgical History:   Past Surgical History:   Procedure Laterality Date    HC TOTAL HYSTERECTOMY        SocHx:   Social History     Tobacco Use    Smoking status: Never    Smokeless tobacco: Never   Substance Use Topics    Alcohol use: Yes     Comment: occasionally      FamHx:? Family History   Problem Relation Age of Onset    Stroke Mother     Diabetes Father     Hypertension Father     Heart Disease Father        Current Medications:    Current Outpatient Medications   Medication Sig Dispense Refill    beclomethasone dipropionate (QVAR REDIHALER HFA) 40 mcg/actuation HFAb inhaler Take 2 Puffs by inhalation two (2) times a day. albuterol (PROVENTIL HFA, VENTOLIN HFA, PROAIR HFA) 90 mcg/actuation inhaler Take 2 Puffs by inhalation. calcium carbonate (TUMS) 200 mg calcium (500 mg) chew Take 500 mg by mouth. fluticasone propionate (FLONASE) 50 mcg/actuation nasal spray       lisinopril-hydroCHLOROthiazide (PRINZIDE, ZESTORETIC) 20-25 mg per tablet       montelukast (SINGULAIR) 10 mg tablet       therapeutic multivitamin-minerals (THERAGRAN-M) tablet Take 1 Tablet by mouth daily. simvastatin (ZOCOR) 10 mg tablet         Allergies:     Allergies   Allergen Reactions    Latex, Natural Rubber Hives, Itching, Rash and Swelling     gloves      Erythromycin Hives    Sulfa (Sulfonamide Antibiotics) Hives        Review of Systems:   Gen:    Denied fevers, chills, malaise, fatigue, weight changes   Resp: Denied shortness of breath, cough, wheezing   CVS: Denied chest pain, palpitations : Denied urinary urgency, frequency, incontinence   GI: Denied nausea, vomiting, constipation, diarrhea   Skin: Denied rashes, wounds   Psych: Denied anxiety, depression   Vasc: Denied claudication, ulcers   Hem: Denied easy bruising/bleeding   MSK: See HPI   Neuro: See HPI         Physical Exam     Vital Signs:   Visit Vitals  /81 (BP 1 Location: Right arm, BP Patient Position: Sitting, BP Cuff Size: Adult)   Pulse 76   Temp 97.9 °F (36.6 °C) (Temporal)   Resp 18   Ht 5' 3\" (1.6 m)   Wt 165 lb 6.4 oz (75 kg)   SpO2 99% Comment: RA   BMI 29.30 kg/m²      General: ??????? Well nourished and well developed female without any acute distress   Psychiatric: ?  Alert and oriented x 3 with normal mood    HEENT: ???????? Atraumatic   Respiratory:   Breathing non-labored and non dyspneic   CV: ???????????????? Peripheral pulses intact, no peripheral edema   Skin: ????????????? No rashes       Neurologic: ?? Sensation: normal and grossly intact thebilateral, lower extremity(s)  Strength: 5/5 in the bilateral, lower extremity(s) Reflexes: reveals 2+ symmetric DTRs throughout   Gait: normal     Musculoskeletal: Lumbar Exam     Inspection:   Alignment: Abnormal scoliosis  Atrophy: None     Tenderness to Palpation:   Lumbar paraspinals Positive +b/l  Lumbar spinous processes Negative  SI Joint:  Positive ++  Gluteal:Positive++   Greater trochanter: Positive b/l      ROM:   Lumbar ROM: Abnormal pain with extension  Lumbar facet loading: Negative  Hip ROM: No reproduction of pain with movement     Special Tests      Slump test: Positive right   SLR: Negative  SELENE: Negative  FADIR: Negative  Log Roll: Negative      Medical Decision Making:    Images: The imaging results as well as the actual images of the studies below were reviewed, visualized and interpreted by me. Labs: The results below were reviewed.      Assessment:   -scoliosis  -right lumbar radiculopathy -l4  -b/l GT bursitis     Plan:      -Physical therapy -  Continue HEP. Exercises provided to help strengthening b/l hips gluteus medius   -Medications - NA  -Diagnostics/Imaging - MRI lumbar spine reviewed  -Injections - NA  -Lifestyle -Discussed activities to avoid and those to continue   -Education - The patient's diagnosis, prognosis and treatment options were discussed today. All questions were answered.     F/U - Follow up if pain returns         [address] [place] [address] and [facility]

## (undated) DEVICE — NDL SPNE MANAN 22GX6IN --

## (undated) DEVICE — BANDAGE ADH W0.75XL3IN UNIV WVN FAB NAT GEN USE STRP N ADH

## (undated) DEVICE — SYR 10ML LUER LOK 1/5ML GRAD --

## (undated) DEVICE — (D)NDL SPNE 22GX15CM -- DISC BY MFR W/NO SUB

## (undated) DEVICE — MEDIA CONTRAST 10ML 200MG/ML 41%

## (undated) DEVICE — TRAY MYEL SFTY +